# Patient Record
Sex: MALE | Race: BLACK OR AFRICAN AMERICAN | Employment: FULL TIME
[De-identification: names, ages, dates, MRNs, and addresses within clinical notes are randomized per-mention and may not be internally consistent; named-entity substitution may affect disease eponyms.]

---

## 2022-03-19 PROBLEM — Z00.00 PREVENTATIVE HEALTH CARE: Status: ACTIVE | Noted: 2021-11-18

## 2022-03-19 PROBLEM — Z28.29 REFUSAL OF INFLUENZA VACCINE BY PROVIDER: Status: ACTIVE | Noted: 2021-11-18

## 2022-03-19 PROBLEM — R80.9 POSITIVE FOR MICROALBUMINURIA: Status: ACTIVE | Noted: 2021-12-20

## 2022-03-19 PROBLEM — E78.5 HYPERLIPIDEMIA: Status: ACTIVE | Noted: 2021-11-18

## 2022-03-20 PROBLEM — I10 HYPERTENSION: Status: ACTIVE | Noted: 2021-11-18

## 2022-03-24 PROBLEM — D64.9 NORMOCYTIC ANEMIA: Status: ACTIVE | Noted: 2022-03-21

## 2022-06-06 ENCOUNTER — NURSE TRIAGE (OUTPATIENT)
Dept: OTHER | Facility: CLINIC | Age: 62
End: 2022-06-06

## 2022-09-21 ENCOUNTER — OFFICE VISIT (OUTPATIENT)
Dept: INTERNAL MEDICINE CLINIC | Facility: CLINIC | Age: 62
End: 2022-09-21
Payer: COMMERCIAL

## 2022-09-21 VITALS
TEMPERATURE: 98.4 F | DIASTOLIC BLOOD PRESSURE: 83 MMHG | BODY MASS INDEX: 21.67 KG/M2 | WEIGHT: 143 LBS | HEIGHT: 68 IN | OXYGEN SATURATION: 99 % | SYSTOLIC BLOOD PRESSURE: 131 MMHG | HEART RATE: 66 BPM

## 2022-09-21 DIAGNOSIS — G89.29 CHRONIC RIGHT SHOULDER PAIN: ICD-10-CM

## 2022-09-21 DIAGNOSIS — I10 ESSENTIAL (PRIMARY) HYPERTENSION: Primary | ICD-10-CM

## 2022-09-21 DIAGNOSIS — M25.511 CHRONIC RIGHT SHOULDER PAIN: ICD-10-CM

## 2022-09-21 DIAGNOSIS — R80.9 POSITIVE FOR MICROALBUMINURIA: ICD-10-CM

## 2022-09-21 DIAGNOSIS — E78.5 HYPERLIPIDEMIA, UNSPECIFIED HYPERLIPIDEMIA TYPE: ICD-10-CM

## 2022-09-21 PROCEDURE — 99214 OFFICE O/P EST MOD 30 MIN: CPT | Performed by: INTERNAL MEDICINE

## 2022-09-21 RX ORDER — ATORVASTATIN CALCIUM 20 MG/1
20 TABLET, FILM COATED ORAL DAILY
Qty: 90 TABLET | Refills: 1 | Status: SHIPPED | OUTPATIENT
Start: 2022-09-21

## 2022-09-21 RX ORDER — AMLODIPINE BESYLATE 10 MG/1
10 TABLET ORAL DAILY
Qty: 90 TABLET | Refills: 1 | Status: SHIPPED | OUTPATIENT
Start: 2022-09-21

## 2022-09-21 RX ORDER — NAPROXEN 500 MG/1
500 TABLET ORAL 2 TIMES DAILY WITH MEALS
Qty: 180 TABLET | Refills: 1 | Status: SHIPPED | OUTPATIENT
Start: 2022-09-21 | End: 2022-11-20

## 2022-09-21 RX ORDER — RAMIPRIL 1.25 MG/1
1.25 CAPSULE ORAL DAILY
Qty: 90 CAPSULE | Refills: 3 | Status: SHIPPED | OUTPATIENT
Start: 2022-09-21

## 2022-09-21 ASSESSMENT — ENCOUNTER SYMPTOMS
VOMITING: 0
BACK PAIN: 0
ABDOMINAL PAIN: 0
CONSTIPATION: 0
COUGH: 0
SHORTNESS OF BREATH: 0
EYE PAIN: 0
DIARRHEA: 0
NAUSEA: 0
SINUS PRESSURE: 0
SINUS PAIN: 0

## 2022-09-21 ASSESSMENT — PATIENT HEALTH QUESTIONNAIRE - PHQ9
SUM OF ALL RESPONSES TO PHQ QUESTIONS 1-9: 0
1. LITTLE INTEREST OR PLEASURE IN DOING THINGS: 0
2. FEELING DOWN, DEPRESSED OR HOPELESS: 0
SUM OF ALL RESPONSES TO PHQ QUESTIONS 1-9: 0
SUM OF ALL RESPONSES TO PHQ9 QUESTIONS 1 & 2: 0

## 2022-09-21 NOTE — PROGRESS NOTES
Sherif Nguyen (: 1960) presents today for:  Chief Complaint   Patient presents with    6 Month Follow-Up     Pt is here for routine 6 mnth check up. Shoulder Pain     Pt states R shoulder pain that is chronic and intermittent but is worsening. Pt states limited ROM. Medication Refill       Patient is a 58-year-old man with a medical history significant for hypertension, hyperlipidemia, normocytic anemia, and microalbuminunuria presents for a follow-up visit. Patient is eligible for refills at this time out of Norvasc and Lipitor. Patient with microalbuminuria is not currently prescribed ACE inhibitor or ARB. Patient previously well tolerant of Altace but no longer taking. We will advise patient that low-dose Altace would be appropriate at this time. Patient with a problem specific complaint at this time of right shoulder pain that is been chronic and has been worsening and intermittent nature over time. He currently has limited range of motion. We will offer patient physical therapy at this time. Limited ROM particularly with internal rotation. Patient with hx of right forearm corrective surgery with plates and screws. Patient also with right elbow corrective surgery. He currently is unable to sleep on his right side. 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 He has used nothing over the counter. Significant internal range of motion limitations. Shoulder Pain   Pertinent negatives include no fever. Assessment/Plan:  1. Essential (primary) hypertension    2. Hyperlipidemia, unspecified hyperlipidemia type    3. Chronic right shoulder pain    4.  Positive for microalbuminuria        Orders Placed This Encounter   Procedures    Jose Escamilla MD, [address]     Referral Priority:   Routine     Referral Type:   Eval and Treat     Referral Reason:   Specialty Services Required     Requested Specialty:   Orthopedic Surgery     Number of Visits Requested:   1       Orders Placed This Encounter   Medications    amLODIPine (NORVASC) 10 MG tablet     Sig: Take 1 tablet by mouth daily     Dispense:  90 tablet     Refill:  1    atorvastatin (LIPITOR) 20 MG tablet     Sig: Take 1 tablet by mouth daily     Dispense:  90 tablet     Refill:  1    naproxen (NAPROSYN) 500 MG tablet     Sig: Take 1 tablet by mouth 2 times daily (with meals)     Dispense:  180 tablet     Refill:  1    ramipril (ALTACE) 1.25 MG capsule     Sig: Take 1 capsule by mouth daily     Dispense:  90 capsule     Refill:  3         Return in about 6 months (around 3/21/2023). Reviewed and updated this visit by provider:  Tobacco  Allergies  Meds  Problems  Med Hx  Surg Hx  Fam Hx         Review of Systems   Constitutional:  Negative for chills and fever. HENT:  Negative for hearing loss, postnasal drip, sinus pressure and sinus pain. Eyes:  Negative for pain and visual disturbance. Respiratory:  Negative for cough and shortness of breath. Cardiovascular:  Negative for chest pain and palpitations. Gastrointestinal:  Negative for abdominal pain, constipation, diarrhea, nausea and vomiting. Endocrine: Negative for polyuria. Genitourinary:  Negative for difficulty urinating, frequency and urgency. Musculoskeletal:  Negative for arthralgias, back pain and neck pain. Skin:  Negative for rash and wound. Neurological:  Negative for dizziness, weakness and headaches. Psychiatric/Behavioral:  Negative for behavioral problems and sleep disturbance. The patient is not nervous/anxious. Visit Vitals  /83   Pulse 66   Temp 98.4 °F (36.9 °C)   Ht 5' 8\" (1.727 m)   Wt 143 lb (64.9 kg)   SpO2 99%   BMI 21.74 kg/m²       Physical Exam  Vitals reviewed. Constitutional:       General: He is not in acute distress. Appearance: Normal appearance. HENT:      Head: Normocephalic and atraumatic.       Right Ear: External ear normal.      Left Ear: External ear normal.      Nose: Nose normal.      Mouth/Throat:      Mouth: Mucous membranes are moist.      Pharynx: Oropharynx is clear. Eyes:      Extraocular Movements: Extraocular movements intact. Conjunctiva/sclera: Conjunctivae normal.   Cardiovascular:      Rate and Rhythm: Normal rate and regular rhythm. Pulses: Normal pulses. Heart sounds: Normal heart sounds. Pulmonary:      Effort: Pulmonary effort is normal.      Breath sounds: Normal breath sounds. No wheezing. Abdominal:      General: Bowel sounds are normal.      Tenderness: There is no abdominal tenderness. Musculoskeletal:         General: No swelling or deformity. Normal range of motion. Cervical back: Normal range of motion. Skin:     General: Skin is warm and dry. Coloration: Skin is not jaundiced. Neurological:      General: No focal deficit present. Mental Status: He is alert and oriented to person, place, and time. Mental status is at baseline. Psychiatric:         Mood and Affect: Mood normal.         Behavior: Behavior normal.         Thought Content:  Thought content normal.           Laila Levy DO

## 2022-09-21 NOTE — ACP (ADVANCE CARE PLANNING)
Advance Care Planning   Advance Care Planning (ACP)     Attempted to discuss ACP with Mr. Amado Narvaez today, he declines to discuss at this time. Will readdress at future visit.

## 2023-04-24 ENCOUNTER — OFFICE VISIT (OUTPATIENT)
Dept: INTERNAL MEDICINE CLINIC | Facility: CLINIC | Age: 63
End: 2023-04-24
Payer: COMMERCIAL

## 2023-04-24 VITALS
BODY MASS INDEX: 22.28 KG/M2 | OXYGEN SATURATION: 98 % | HEART RATE: 65 BPM | HEIGHT: 68 IN | WEIGHT: 147 LBS | TEMPERATURE: 98.4 F | DIASTOLIC BLOOD PRESSURE: 73 MMHG | SYSTOLIC BLOOD PRESSURE: 119 MMHG

## 2023-04-24 DIAGNOSIS — R80.9 PROTEINURIA, UNSPECIFIED TYPE: ICD-10-CM

## 2023-04-24 DIAGNOSIS — I10 ESSENTIAL (PRIMARY) HYPERTENSION: Primary | ICD-10-CM

## 2023-04-24 DIAGNOSIS — G89.29 CHRONIC RIGHT SHOULDER PAIN: ICD-10-CM

## 2023-04-24 DIAGNOSIS — E78.5 HYPERLIPIDEMIA, UNSPECIFIED HYPERLIPIDEMIA TYPE: ICD-10-CM

## 2023-04-24 DIAGNOSIS — R80.9 POSITIVE FOR MICROALBUMINURIA: ICD-10-CM

## 2023-04-24 DIAGNOSIS — M25.511 CHRONIC RIGHT SHOULDER PAIN: ICD-10-CM

## 2023-04-24 DIAGNOSIS — D64.9 ANEMIA, UNSPECIFIED TYPE: ICD-10-CM

## 2023-04-24 LAB
ERYTHROCYTE [DISTWIDTH] IN BLOOD BY AUTOMATED COUNT: 12 % (ref 11.9–14.6)
HCT VFR BLD AUTO: 38.8 % (ref 41.1–50.3)
HGB BLD-MCNC: 12.5 G/DL (ref 13.6–17.2)
MCH RBC QN AUTO: 31.6 PG (ref 26.1–32.9)
MCHC RBC AUTO-ENTMCNC: 32.2 G/DL (ref 31.4–35)
MCV RBC AUTO: 98.2 FL (ref 82–102)
NRBC # BLD: 0 K/UL (ref 0–0.2)
PLATELET # BLD AUTO: 305 K/UL (ref 150–450)
PMV BLD AUTO: 10.6 FL (ref 9.4–12.3)
RBC # BLD AUTO: 3.95 M/UL (ref 4.23–5.6)
WBC # BLD AUTO: 12.5 K/UL (ref 4.3–11.1)

## 2023-04-24 PROCEDURE — 3078F DIAST BP <80 MM HG: CPT | Performed by: INTERNAL MEDICINE

## 2023-04-24 PROCEDURE — 99214 OFFICE O/P EST MOD 30 MIN: CPT | Performed by: INTERNAL MEDICINE

## 2023-04-24 PROCEDURE — 3074F SYST BP LT 130 MM HG: CPT | Performed by: INTERNAL MEDICINE

## 2023-04-24 RX ORDER — AMLODIPINE BESYLATE 10 MG/1
10 TABLET ORAL DAILY
Qty: 90 TABLET | Refills: 1 | Status: SHIPPED | OUTPATIENT
Start: 2023-04-24

## 2023-04-24 RX ORDER — RAMIPRIL 1.25 MG/1
1.25 CAPSULE ORAL DAILY
Qty: 90 CAPSULE | Refills: 1 | Status: SHIPPED | OUTPATIENT
Start: 2023-04-24

## 2023-04-24 RX ORDER — ATORVASTATIN CALCIUM 20 MG/1
20 TABLET, FILM COATED ORAL DAILY
Qty: 90 TABLET | Refills: 1 | Status: SHIPPED | OUTPATIENT
Start: 2023-04-24

## 2023-04-24 ASSESSMENT — PATIENT HEALTH QUESTIONNAIRE - PHQ9
1. LITTLE INTEREST OR PLEASURE IN DOING THINGS: 0
SUM OF ALL RESPONSES TO PHQ QUESTIONS 1-9: 0
2. FEELING DOWN, DEPRESSED OR HOPELESS: 0
SUM OF ALL RESPONSES TO PHQ9 QUESTIONS 1 & 2: 0
SUM OF ALL RESPONSES TO PHQ QUESTIONS 1-9: 0

## 2023-04-24 NOTE — ACP (ADVANCE CARE PLANNING)
Advance Care Planning   The patient has the following advanced directives on file:  Advance Directives       Power of [address] ACP-Advance Directive ACP-Power of     Not on File Not on File Not on File Not on File            The patient has appointed the following active healthcare agents:    Primary Decision Maker: Ebonie Cortez - Spouse - [phone]    The Patient has the following current code status:    Code Status: Not on file    Visit Documentation:  I discussed [address] with Madeline Lancaster today which included the importance of making their choices for care and treatment in the case of a health event that adversely affects their decision-making abilities. He has not completed the Advance Care Directives. He does not have an active health care agent at this time. Madeline Lancaster was encouraged to complete the declaration forms and provide a signed copy of his medical records.      Aaron Mead  4/24/2023

## 2023-04-24 NOTE — ASSESSMENT & PLAN NOTE
Referred previously to Ortho and PT. Patients shoulder pain chronic and intermittent in nature with decreased range of mostion exacerbated by work. Patient has not yet established with area orthopedics. Referral is closed. Will refer again. Checking XR at this time. Will offer Voltaren gel topically. If no bony abnormality, will offer intra-articular steroid injection in 1 week. Will refer to Ortho again.

## 2023-04-24 NOTE — ASSESSMENT & PLAN NOTE
Agreeable to checking lipids today to confirm dosing. Key Hyperlipidemia Meds          atorvastatin (LIPITOR) 20 MG tablet (Taking)    Sig - Route:  Take 1 tablet by mouth daily - Oral

## 2023-04-24 NOTE — PROGRESS NOTES
Cristiana Bernstein (: 1960) is a 65 y.o. male, here for evaluation of the following chief complaint(s):  Hypertension (Pt is here for routine 3 month check up.), Medication Refill, and Shoulder Pain (Pt states chronic pain in R shoulder that has been present x 1 year but is worsening.)       ASSESSMENT/PLAN:  1. Essential (primary) hypertension  Assessment & Plan:   Microalbuin present recently. Started back on low dose Altace. Tolerant of Norvasc 10. Key Anti-Hypertensive Meds            amLODIPine (NORVASC) 10 MG tablet    Sig - Route: Take 1 tablet by mouth daily - Oral    ramipril (ALTACE) 1.25 MG capsule    Sig - Route: Take 1 capsule by mouth daily - Oral            Orders:  -     amLODIPine (NORVASC) 10 MG tablet; Take 1 tablet by mouth daily, Disp-90 tablet, R-1Normal  -     ramipril (ALTACE) 1.25 MG capsule; Take 1 capsule by mouth daily, Disp-90 capsule, R-1Normal  2. Chronic right shoulder pain  Assessment & Plan:   Referred previously to Ortho and PT. Patients shoulder pain chronic and intermittent in nature with decreased range of mostion exacerbated by work. Patient has not yet established with area orthopedics. Referral is closed. Will refer again. Checking XR at this time. Will offer Voltaren gel topically. If no bony abnormality, will offer intra-articular steroid injection in 1 week. Will refer to Ortho again. Orders:  -     Vitamin D 25 Hydroxy; Future  -     XR SHOULDER RIGHT (MIN 2 VIEWS); Future  -     diclofenac sodium (VOLTAREN) 1 % GEL; Apply 4 g topically 4 times daily, Topical, 4 TIMES DAILY Starting Mon 2023, Disp-100 g, R-3, Normal  -     [address] [place] [address]  3. Anemia, unspecified type  -     CBC; Future  4. Hyperlipidemia, unspecified hyperlipidemia type  Assessment & Plan:   Agreeable to checking lipids today to confirm dosing.    Key Hyperlipidemia Meds            atorvastatin (LIPITOR) 20 MG tablet (Taking)    Sig -

## 2023-04-24 NOTE — ASSESSMENT & PLAN NOTE
Microalbuin present recently. Started back on low dose Altace. Tolerant of Norvasc 10. Key Anti-Hypertensive Meds          amLODIPine (NORVASC) 10 MG tablet    Sig - Route: Take 1 tablet by mouth daily - Oral    ramipril (ALTACE) 1.25 MG capsule    Sig - Route:  Take 1 capsule by mouth daily - Oral

## 2023-04-25 LAB
25(OH)D3 SERPL-MCNC: 20.3 NG/ML (ref 30–100)
ALBUMIN SERPL-MCNC: 4 G/DL (ref 3.2–4.6)
ALBUMIN/GLOB SERPL: 1.4 (ref 0.4–1.6)
ALP SERPL-CCNC: 57 U/L (ref 50–136)
ALT SERPL-CCNC: 47 U/L (ref 12–65)
ANION GAP SERPL CALC-SCNC: 3 MMOL/L (ref 2–11)
AST SERPL-CCNC: 28 U/L (ref 15–37)
BILIRUB SERPL-MCNC: 0.8 MG/DL (ref 0.2–1.1)
BUN SERPL-MCNC: 18 MG/DL (ref 8–23)
CALCIUM SERPL-MCNC: 9.3 MG/DL (ref 8.3–10.4)
CHLORIDE SERPL-SCNC: 108 MMOL/L (ref 101–110)
CHOLEST SERPL-MCNC: 142 MG/DL
CO2 SERPL-SCNC: 28 MMOL/L (ref 21–32)
CREAT SERPL-MCNC: 0.9 MG/DL (ref 0.8–1.5)
GLOBULIN SER CALC-MCNC: 2.8 G/DL (ref 2.8–4.5)
GLUCOSE SERPL-MCNC: 87 MG/DL (ref 65–100)
HDLC SERPL-MCNC: 72 MG/DL (ref 40–60)
HDLC SERPL: 2
LDLC SERPL CALC-MCNC: 54.4 MG/DL
POTASSIUM SERPL-SCNC: 4.6 MMOL/L (ref 3.5–5.1)
PROT SERPL-MCNC: 6.8 G/DL (ref 6.3–8.2)
SODIUM SERPL-SCNC: 139 MMOL/L (ref 133–143)
TRIGL SERPL-MCNC: 78 MG/DL (ref 35–150)
VLDLC SERPL CALC-MCNC: 15.6 MG/DL (ref 6–23)

## 2023-05-01 ENCOUNTER — TELEPHONE (OUTPATIENT)
Dept: INTERNAL MEDICINE CLINIC | Facility: CLINIC | Age: 63
End: 2023-05-01

## 2023-05-01 NOTE — TELEPHONE ENCOUNTER
Mrs Fannie Espinosa called, very concerned that on 4/24, her  was given someone else's paperwork . It was just noticed today, they were looking for the appointment on 5/4 for a steroid injection. Mrs Fannie Espinosa is very concerned that her 's paperwork was given to somebody else. Please call.

## 2023-05-01 NOTE — TELEPHONE ENCOUNTER
I called and she stated that her  was given the wrong paperwork when he had his last appt on 04/24/2023. I ask her to bring the paperwork back and she stated that he would have to be 05/04/2023 his next appt . Told her that I would figure out what happen and I am very sorry . She stated that she just noticed it .

## 2023-05-02 ENCOUNTER — TELEPHONE (OUTPATIENT)
Dept: INTERNAL MEDICINE CLINIC | Facility: CLINIC | Age: 63
End: 2023-05-02

## 2023-05-04 ENCOUNTER — OFFICE VISIT (OUTPATIENT)
Dept: INTERNAL MEDICINE CLINIC | Facility: CLINIC | Age: 63
End: 2023-05-04

## 2023-05-04 VITALS
HEIGHT: 68 IN | SYSTOLIC BLOOD PRESSURE: 125 MMHG | TEMPERATURE: 98.4 F | WEIGHT: 147.6 LBS | OXYGEN SATURATION: 97 % | DIASTOLIC BLOOD PRESSURE: 76 MMHG | HEART RATE: 69 BPM | BODY MASS INDEX: 22.37 KG/M2

## 2023-05-04 DIAGNOSIS — M25.511 CHRONIC RIGHT SHOULDER PAIN: Primary | ICD-10-CM

## 2023-05-04 DIAGNOSIS — G89.29 CHRONIC RIGHT SHOULDER PAIN: Primary | ICD-10-CM

## 2023-05-04 DIAGNOSIS — Z12.5 PROSTATE CANCER SCREENING: ICD-10-CM

## 2023-05-04 DIAGNOSIS — E55.9 VITAMIN D DEFICIENCY: ICD-10-CM

## 2023-05-04 RX ORDER — LIDOCAINE HYDROCHLORIDE 10 MG/ML
5 INJECTION, SOLUTION INFILTRATION; PERINEURAL ONCE
Status: COMPLETED | OUTPATIENT
Start: 2023-05-04 | End: 2023-05-04

## 2023-05-04 RX ORDER — TRIAMCINOLONE ACETONIDE 40 MG/ML
40 INJECTION, SUSPENSION INTRA-ARTICULAR; INTRAMUSCULAR ONCE
Status: COMPLETED | OUTPATIENT
Start: 2023-05-04 | End: 2023-05-04

## 2023-05-04 RX ORDER — MULTIVIT WITH MIN/MFOLATE/K2 340-15/3 G
5000 POWDER (GRAM) ORAL DAILY
Qty: 90 CAPSULE | Refills: 3 | Status: SHIPPED | OUTPATIENT
Start: 2023-05-04

## 2023-05-04 RX ADMIN — TRIAMCINOLONE ACETONIDE 40 MG: 40 INJECTION, SUSPENSION INTRA-ARTICULAR; INTRAMUSCULAR at 14:52

## 2023-05-04 RX ADMIN — LIDOCAINE HYDROCHLORIDE 5 ML: 10 INJECTION, SOLUTION INFILTRATION; PERINEURAL at 14:48

## 2023-05-04 SDOH — ECONOMIC STABILITY: FOOD INSECURITY: WITHIN THE PAST 12 MONTHS, THE FOOD YOU BOUGHT JUST DIDN'T LAST AND YOU DIDN'T HAVE MONEY TO GET MORE.: NEVER TRUE

## 2023-05-04 SDOH — ECONOMIC STABILITY: FOOD INSECURITY: WITHIN THE PAST 12 MONTHS, YOU WORRIED THAT YOUR FOOD WOULD RUN OUT BEFORE YOU GOT MONEY TO BUY MORE.: NEVER TRUE

## 2023-05-04 SDOH — ECONOMIC STABILITY: HOUSING INSECURITY
IN THE LAST 12 MONTHS, WAS THERE A TIME WHEN YOU DID NOT HAVE A STEADY PLACE TO SLEEP OR SLEPT IN A SHELTER (INCLUDING NOW)?: NO

## 2023-05-04 SDOH — ECONOMIC STABILITY: INCOME INSECURITY: HOW HARD IS IT FOR YOU TO PAY FOR THE VERY BASICS LIKE FOOD, HOUSING, MEDICAL CARE, AND HEATING?: NOT VERY HARD

## 2023-05-04 ASSESSMENT — PATIENT HEALTH QUESTIONNAIRE - PHQ9
SUM OF ALL RESPONSES TO PHQ QUESTIONS 1-9: 0
2. FEELING DOWN, DEPRESSED OR HOPELESS: 0
SUM OF ALL RESPONSES TO PHQ QUESTIONS 1-9: 0
1. LITTLE INTEREST OR PLEASURE IN DOING THINGS: 0
SUM OF ALL RESPONSES TO PHQ9 QUESTIONS 1 & 2: 0

## 2023-05-04 NOTE — ASSESSMENT & PLAN NOTE
Recommending 5000 IU D3 brittany.    Lab Results   Component Value Date/Time    VITD25 20.3 04/24/2023 01:51 PM

## 2023-05-04 NOTE — ASSESSMENT & PLAN NOTE
Brother with prostate cancer. . Would like to being surveillance. Using shared decision making, will utilize annual or semi-annual screening.

## 2023-05-04 NOTE — PROGRESS NOTES
Micha Cowan (: 1960) is a 65 y.o. male, here for evaluation of the following chief complaint(s):  Shoulder Pain (Cortisone Injection Today R Shoulder)       ASSESSMENT/PLAN:  1. Chronic right shoulder pain  Assessment & Plan:  Patient with chronic right shoulder pain. He is agreeable to intra-articular steroid injection in office today. Offering injection with posterior approach using Kenalog 5 and Lidocaine without epinephrine 3 mg. Orders:  -     triamcinolone acetonide (KENALOG) injection 10 mg; 10 mg, Intra-artICUlar, ONCE, 1 dose, On Thu 23 at 1500  -     lidocaine 1 % injection 5 mL; 5 mL, IntraDERmal, ONCE, 1 dose, On Thu 23 at 1500  2. Prostate cancer screening  Assessment & Plan:   Brother with prostate cancer. . Would like to being surveillance. Using shared decision making, will utilize annual or semi-annual screening. Orders:  -     PSA Screening; Future  3. Vitamin D deficiency  Assessment & Plan:  Recommending 5000 IU D3 dailo. Lab Results   Component Value Date/Time    VITD25 20.3 2023 01:51 PM        Orders:  -     vitamin D (D3 5000) 125 MCG (5000 UT) CAPS capsule; Take 1 capsule by mouth daily, Disp-90 capsule, R-3Normal             SUBJECTIVE/OBJECTIVE:  HPI: Patient is a pleasant 81-year-old man with medical history significant for hypertension, chronic right shoulder pain presenting for problem specific complaint of shoulder pain. Patient shoulder has been worsening over the past several months. Patient is agreeable to intra-articular steroid injection at this time. Superficial skin Marked with Pen for insertion. Area cleaned with Betadine. Injection into the area. Patient tolerated procedure well. Social History     Tobacco Use    Smoking status: Never    Smokeless tobacco: Never   Vaping Use    Vaping Use: Never used   Substance Use Topics    Alcohol use:  Yes     Alcohol/week: 1.0 standard drink     Types: 1 Shots of liquor per

## 2023-05-04 NOTE — ACP (ADVANCE CARE PLANNING)
Advance Care Planning   The patient has the following advanced directives on file:  Advance Directives       Power of [address] ACP-Advance Directive ACP-Power of     Not on File Not on File Not on File Not on File            The patient has appointed the following active healthcare agents:    Primary Decision Maker: Kpshona Corby - Spouse - [phone]    The Patient has the following current code status:    Code Status: Not on file    Visit Documentation:  I discussed [address] with Aureliano Santana today which included the importance of making their choices for care and treatment in the case of a health event that adversely affects their decision-making abilities. He has not completed the Advance Care Directives. He does not have an active health care agent at this time. Aureliano Santana was encouraged to complete the declaration forms and provide a signed copy of his medical records.          Aaron Mead  5/4/2023

## 2023-05-04 NOTE — ASSESSMENT & PLAN NOTE
Patient with chronic right shoulder pain. He is agreeable to intra-articular steroid injection in office today. Offering injection with posterior approach using Kenalog 5 and Lidocaine without epinephrine 3 mg.

## 2023-05-05 LAB — PSA SERPL-MCNC: 0.8 NG/ML

## 2023-05-09 ENCOUNTER — OFFICE VISIT (OUTPATIENT)
Dept: INTERNAL MEDICINE CLINIC | Facility: CLINIC | Age: 63
End: 2023-05-09
Payer: COMMERCIAL

## 2023-05-09 VITALS
HEIGHT: 68 IN | OXYGEN SATURATION: 96 % | SYSTOLIC BLOOD PRESSURE: 116 MMHG | WEIGHT: 145.2 LBS | TEMPERATURE: 98.5 F | DIASTOLIC BLOOD PRESSURE: 75 MMHG | BODY MASS INDEX: 22.01 KG/M2 | HEART RATE: 59 BPM

## 2023-05-09 DIAGNOSIS — R10.9 RIGHT FLANK PAIN: ICD-10-CM

## 2023-05-09 DIAGNOSIS — I10 ESSENTIAL (PRIMARY) HYPERTENSION: ICD-10-CM

## 2023-05-09 DIAGNOSIS — Z12.5 PROSTATE CANCER SCREENING: ICD-10-CM

## 2023-05-09 DIAGNOSIS — M54.50 PAIN IN RIGHT LUMBAR REGION OF BACK: Primary | ICD-10-CM

## 2023-05-09 LAB
ALBUMIN SERPL-MCNC: 4 G/DL (ref 3.2–4.6)
ALBUMIN/GLOB SERPL: 1.4 (ref 0.4–1.6)
ALP SERPL-CCNC: 57 U/L (ref 50–136)
ALT SERPL-CCNC: 43 U/L (ref 12–65)
ANION GAP SERPL CALC-SCNC: 4 MMOL/L (ref 2–11)
APPEARANCE UR: CLEAR
AST SERPL-CCNC: 27 U/L (ref 15–37)
BACTERIA URNS QL MICRO: NEGATIVE /HPF
BILIRUB SERPL-MCNC: 1.3 MG/DL (ref 0.2–1.1)
BILIRUB UR QL: NEGATIVE
BUN SERPL-MCNC: 14 MG/DL (ref 8–23)
CALCIUM SERPL-MCNC: 9.3 MG/DL (ref 8.3–10.4)
CASTS URNS QL MICRO: NORMAL /LPF (ref 0–2)
CHLORIDE SERPL-SCNC: 107 MMOL/L (ref 101–110)
CO2 SERPL-SCNC: 27 MMOL/L (ref 21–32)
COLOR UR: NORMAL
CREAT SERPL-MCNC: 1 MG/DL (ref 0.8–1.5)
EPI CELLS #/AREA URNS HPF: NORMAL /HPF (ref 0–5)
ERYTHROCYTE [DISTWIDTH] IN BLOOD BY AUTOMATED COUNT: 12.5 % (ref 11.9–14.6)
GLOBULIN SER CALC-MCNC: 2.8 G/DL (ref 2.8–4.5)
GLUCOSE SERPL-MCNC: 88 MG/DL (ref 65–100)
GLUCOSE UR STRIP.AUTO-MCNC: NEGATIVE MG/DL
HCT VFR BLD AUTO: 38.6 % (ref 41.1–50.3)
HGB BLD-MCNC: 12.1 G/DL (ref 13.6–17.2)
HGB UR QL STRIP: NEGATIVE
KETONES UR QL STRIP.AUTO: NEGATIVE MG/DL
LEUKOCYTE ESTERASE UR QL STRIP.AUTO: NEGATIVE
MCH RBC QN AUTO: 31.5 PG (ref 26.1–32.9)
MCHC RBC AUTO-ENTMCNC: 31.3 G/DL (ref 31.4–35)
MCV RBC AUTO: 100.5 FL (ref 82–102)
MUCOUS THREADS URNS QL MICRO: 0 /LPF
NITRITE UR QL STRIP.AUTO: NEGATIVE
NRBC # BLD: 0 K/UL (ref 0–0.2)
PH UR STRIP: 6 (ref 5–9)
PLATELET # BLD AUTO: 282 K/UL (ref 150–450)
PMV BLD AUTO: 10.3 FL (ref 9.4–12.3)
POTASSIUM SERPL-SCNC: 4.3 MMOL/L (ref 3.5–5.1)
PROT SERPL-MCNC: 6.8 G/DL (ref 6.3–8.2)
PROT UR STRIP-MCNC: NEGATIVE MG/DL
RBC # BLD AUTO: 3.84 M/UL (ref 4.23–5.6)
RBC #/AREA URNS HPF: NORMAL /HPF (ref 0–5)
SODIUM SERPL-SCNC: 138 MMOL/L (ref 133–143)
SP GR UR REFRACTOMETRY: 1.01 (ref 1–1.02)
URINE CULTURE IF INDICATED: NORMAL
UROBILINOGEN UR QL STRIP.AUTO: 0.2 EU/DL (ref 0.2–1)
WBC # BLD AUTO: 12.5 K/UL (ref 4.3–11.1)
WBC URNS QL MICRO: NORMAL /HPF (ref 0–4)

## 2023-05-09 PROCEDURE — 99214 OFFICE O/P EST MOD 30 MIN: CPT | Performed by: INTERNAL MEDICINE

## 2023-05-09 PROCEDURE — 3078F DIAST BP <80 MM HG: CPT | Performed by: INTERNAL MEDICINE

## 2023-05-09 PROCEDURE — 3074F SYST BP LT 130 MM HG: CPT | Performed by: INTERNAL MEDICINE

## 2023-05-09 RX ORDER — KETOROLAC TROMETHAMINE 10 MG/1
10 TABLET, FILM COATED ORAL EVERY 6 HOURS PRN
Qty: 20 TABLET | Refills: 0 | Status: SHIPPED | OUTPATIENT
Start: 2023-05-09 | End: 2024-05-08

## 2023-05-09 RX ORDER — CYCLOBENZAPRINE HCL 5 MG
5 TABLET ORAL 3 TIMES DAILY PRN
Qty: 30 TABLET | Refills: 0 | Status: SHIPPED | OUTPATIENT
Start: 2023-05-09 | End: 2023-05-19

## 2023-05-09 ASSESSMENT — PATIENT HEALTH QUESTIONNAIRE - PHQ9
2. FEELING DOWN, DEPRESSED OR HOPELESS: 0
SUM OF ALL RESPONSES TO PHQ QUESTIONS 1-9: 0
1. LITTLE INTEREST OR PLEASURE IN DOING THINGS: 0
SUM OF ALL RESPONSES TO PHQ QUESTIONS 1-9: 0
SUM OF ALL RESPONSES TO PHQ9 QUESTIONS 1 & 2: 0
SUM OF ALL RESPONSES TO PHQ QUESTIONS 1-9: 0
SUM OF ALL RESPONSES TO PHQ QUESTIONS 1-9: 0

## 2023-05-09 NOTE — ASSESSMENT & PLAN NOTE
Well controlled in office. Key Anti-Hypertensive Meds          amLODIPine (NORVASC) 10 MG tablet (Taking)    Sig - Route: Take 1 tablet by mouth daily - Oral    ramipril (ALTACE) 1.25 MG capsule (Taking)    Sig - Route:  Take 1 capsule by mouth daily - Oral

## 2023-05-09 NOTE — ACP (ADVANCE CARE PLANNING)
Advance Care Planning   The patient has the following advanced directives on file:  Advance Directives       Power of [address] ACP-Advance Directive ACP-Power of     Not on File Not on File Not on File Not on File            The patient has appointed the following active healthcare agents:    Primary Decision Maker: Shilpa Cole - Spouse - [phone]    The Patient has the following current code status:    Code Status: Not on file    Visit Documentation:  I discussed [address] with Natalie Ybarra today which included the importance of making their choices for care and treatment in the case of a health event that adversely affects their decision-making abilities. He has not completed the Advance Care Directives. He does not have an active health care agent at this time. Natalie Ybarra was encouraged to complete the declaration forms and provide a signed copy of his medical records.      Aaron Mead  5/9/2023

## 2023-05-09 NOTE — ASSESSMENT & PLAN NOTE
Mom has baby latched well on right breast. She denies pain. Encouraged to call if needing assistance.    Pain rated 710. Described as sharp and achey. Right sided radicular pain. Recent PSA reassuring. Checking XR at this time.

## 2023-05-09 NOTE — PROGRESS NOTES
Merlinda Dutton (: 1960) is a 65 y.o. male, here for evaluation of the following chief complaint(s):  Lower Back Pain (Pt states that he is having some pain in his lower back on the R side that has been present x 2 weeks.)       ASSESSMENT/PLAN:  1. Pain in right lumbar region of back             SUBJECTIVE/OBJECTIVE:  HPI:  Social History     Tobacco Use    Smoking status: Never    Smokeless tobacco: Never   Vaping Use    Vaping Use: Never used   Substance Use Topics    Alcohol use: Yes     Alcohol/week: 1.0 standard drink     Types: 1 Shots of liquor per week    Drug use: Never     Vitals:    23 1000   BP: 116/75   Pulse: 59   Temp: 98.5 °F (36.9 °C)   SpO2: 96%   Weight: 145 lb 3.2 oz (65.9 kg)   Height: 5' 8\" (1.727 m)      Body mass index is 22.08 kg/m². Physical Exam  Constitutional:       Appearance: Normal appearance. HENT:      Head: Normocephalic. Eyes:      Extraocular Movements: Extraocular movements intact. Cardiovascular:      Rate and Rhythm: Normal rate and regular rhythm. Abdominal:      General: Abdomen is flat. Bowel sounds are normal.      Palpations: Abdomen is soft. Tenderness: There is right CVA tenderness. Skin:     General: Skin is warm and dry. Neurological:      Mental Status: He is alert. An electronic signature was used to authenticate this note.   Mandy Villa DO

## 2023-05-16 PROBLEM — M19.072 ARTHRITIS OF LEFT SUBTALAR JOINT: Status: ACTIVE | Noted: 2018-04-02

## 2023-05-16 PROBLEM — M25.572 LEFT LATERAL ANKLE PAIN: Status: ACTIVE | Noted: 2017-05-01

## 2023-05-16 PROBLEM — S42.301A RIGHT ARM FRACTURE: Status: ACTIVE | Noted: 2023-05-16

## 2023-05-16 PROBLEM — G44.009 CLUSTER HEADACHES: Status: ACTIVE | Noted: 2023-04-13

## 2023-06-03 PROBLEM — Z12.5 PROSTATE CANCER SCREENING: Status: RESOLVED | Noted: 2023-05-04 | Resolved: 2023-06-03

## 2023-06-20 ENCOUNTER — OFFICE VISIT (OUTPATIENT)
Dept: INTERNAL MEDICINE CLINIC | Facility: CLINIC | Age: 63
End: 2023-06-20
Payer: COMMERCIAL

## 2023-06-20 VITALS
OXYGEN SATURATION: 95 % | DIASTOLIC BLOOD PRESSURE: 92 MMHG | HEART RATE: 71 BPM | TEMPERATURE: 98.4 F | SYSTOLIC BLOOD PRESSURE: 123 MMHG | HEIGHT: 68 IN | WEIGHT: 146.2 LBS | BODY MASS INDEX: 22.16 KG/M2

## 2023-06-20 DIAGNOSIS — I10 ESSENTIAL (PRIMARY) HYPERTENSION: Primary | ICD-10-CM

## 2023-06-20 DIAGNOSIS — E55.9 VITAMIN D DEFICIENCY: ICD-10-CM

## 2023-06-20 DIAGNOSIS — D64.9 ANEMIA, UNSPECIFIED TYPE: ICD-10-CM

## 2023-06-20 DIAGNOSIS — S61.235S: ICD-10-CM

## 2023-06-20 LAB
CRP SERPL-MCNC: <0.3 MG/DL (ref 0–0.9)
ERYTHROCYTE [DISTWIDTH] IN BLOOD BY AUTOMATED COUNT: 12.7 % (ref 11.9–14.6)
ERYTHROCYTE [SEDIMENTATION RATE] IN BLOOD: 5 MM/HR
FERRITIN SERPL-MCNC: 192 NG/ML (ref 8–388)
HCT VFR BLD AUTO: 42 % (ref 41.1–50.3)
HGB BLD-MCNC: 13.2 G/DL (ref 13.6–17.2)
MCH RBC QN AUTO: 32.1 PG (ref 26.1–32.9)
MCHC RBC AUTO-ENTMCNC: 31.4 G/DL (ref 31.4–35)
MCV RBC AUTO: 102.2 FL (ref 82–102)
NRBC # BLD: 0 K/UL (ref 0–0.2)
PLATELET # BLD AUTO: 279 K/UL (ref 150–450)
PMV BLD AUTO: 10.6 FL (ref 9.4–12.3)
RBC # BLD AUTO: 4.11 M/UL (ref 4.23–5.6)
WBC # BLD AUTO: 9.6 K/UL (ref 4.3–11.1)

## 2023-06-20 PROCEDURE — 99214 OFFICE O/P EST MOD 30 MIN: CPT | Performed by: INTERNAL MEDICINE

## 2023-06-20 PROCEDURE — 3074F SYST BP LT 130 MM HG: CPT | Performed by: INTERNAL MEDICINE

## 2023-06-20 PROCEDURE — 3080F DIAST BP >= 90 MM HG: CPT | Performed by: INTERNAL MEDICINE

## 2023-06-20 RX ORDER — DOXYCYCLINE HYCLATE 100 MG
100 TABLET ORAL 2 TIMES DAILY
Qty: 20 TABLET | Refills: 0 | Status: SHIPPED | OUTPATIENT
Start: 2023-06-20 | End: 2023-06-30

## 2023-06-20 ASSESSMENT — PATIENT HEALTH QUESTIONNAIRE - PHQ9
SUM OF ALL RESPONSES TO PHQ QUESTIONS 1-9: 0
1. LITTLE INTEREST OR PLEASURE IN DOING THINGS: 0
SUM OF ALL RESPONSES TO PHQ QUESTIONS 1-9: 0
SUM OF ALL RESPONSES TO PHQ QUESTIONS 1-9: 0
SUM OF ALL RESPONSES TO PHQ9 QUESTIONS 1 & 2: 0
SUM OF ALL RESPONSES TO PHQ QUESTIONS 1-9: 0
2. FEELING DOWN, DEPRESSED OR HOPELESS: 0

## 2023-06-20 NOTE — ASSESSMENT & PLAN NOTE
Slight elevated in office to 123/92. Key Anti-Hypertensive Meds          amLODIPine (NORVASC) 10 MG tablet (Taking)    Sig - Route: Take 1 tablet by mouth daily - Oral    ramipril (ALTACE) 1.25 MG capsule (Taking)    Sig - Route: Take 1 capsule by mouth daily - Oral        Will continue. Consider dose increase. Recommending home BP monitoring.

## 2023-06-20 NOTE — ASSESSMENT & PLAN NOTE
.lastivtd  Lab Results   Component Value Date/Time    VITD25 20.3 04/24/2023 01:51 PM      prescribed 5000 IU D3 daily.

## 2023-06-20 NOTE — PROGRESS NOTES
Dolly Epley (: 1960) is a 65 y.o. male, here for evaluation of the following chief complaint(s):  1 Month Follow-Up (Pt is here to f/u on previous back pain. Pt states pain only occurs if he bends over. Pt states pain is intermittent.) and Finger Pain (Pt states he has a pain in his 2nd digit on the L hand that looks swollen and is painful.)       ASSESSMENT/PLAN:  1. Essential (primary) hypertension  Assessment & Plan:   Slight elevated in office to 123/92. Key Anti-Hypertensive Meds            amLODIPine (NORVASC) 10 MG tablet (Taking)    Sig - Route: Take 1 tablet by mouth daily - Oral    ramipril (ALTACE) 1.25 MG capsule (Taking)    Sig - Route: Take 1 capsule by mouth daily - Oral          Will continue. Consider dose increase. Recommending home BP monitoring. 2. Vitamin D deficiency  Assessment & Plan:  .lastivtd  Lab Results   Component Value Date/Time    VITD25 20.3 2023 01:51 PM      prescribed 5000 IU D3 daily. 3. Anemia, unspecified type  Assessment & Plan:  Single episode of anemia with increase leukocyte count on establishment labs. SUBJECTIVE/OBJECTIVE:  HPI: Patient is a 71-year-old man with medical history significant for hypertension, cluster headache, vitamin D deficiency, and hyperlipidemia that presents for routine follow-up visit. Patient with establishment labs and a single episode of anemia most recently. Patient eligible for all routine screening vaccination methods. Blood pressure slightly elevated in office today to 123/92 on 2 agents. Concern for left 4th digit foreign body or injury. Initial injury occurred almost 2 years ago with a nail gun. He nailed through a hard window hitting his finger. Significant bleeding at that time, but patinet did not seek medical care. Patient with continued pain and significant edema. Exquisit pain with palpation. Offering XR at this time and course of doxycycline for MRSA coverage.  Patient may respond well

## 2023-06-20 NOTE — ACP (ADVANCE CARE PLANNING)
Advance Care Planning   The patient has the following advanced directives on file:  Advance Directives       Power of [address] ACP-Advance Directive ACP-Power of     Not on File Not on File Not on File Not on File            The patient has appointed the following active healthcare agents:    Primary Decision Maker: Rosalino Child - Spouse - [phone]    The Patient has the following current code status:    Code Status: Not on file    Visit Documentation:  I discussed [address] with Rock Herring today which included the importance of making their choices for care and treatment in the case of a health event that adversely affects their decision-making abilities. He has not completed the Advance Care Directives. He does not have an active health care agent at this time. Rock Herring was encouraged to complete the declaration forms and provide a signed copy of his medical records.        Aaron Mead  6/20/2023

## 2023-08-04 ENCOUNTER — OFFICE VISIT (OUTPATIENT)
Dept: INTERNAL MEDICINE CLINIC | Facility: CLINIC | Age: 63
End: 2023-08-04
Payer: COMMERCIAL

## 2023-08-04 VITALS
OXYGEN SATURATION: 97 % | DIASTOLIC BLOOD PRESSURE: 78 MMHG | WEIGHT: 147.4 LBS | SYSTOLIC BLOOD PRESSURE: 127 MMHG | HEIGHT: 68 IN | TEMPERATURE: 98.5 F | BODY MASS INDEX: 22.34 KG/M2 | HEART RATE: 65 BPM

## 2023-08-04 DIAGNOSIS — M25.511 CHRONIC RIGHT SHOULDER PAIN: ICD-10-CM

## 2023-08-04 DIAGNOSIS — I10 ESSENTIAL (PRIMARY) HYPERTENSION: ICD-10-CM

## 2023-08-04 DIAGNOSIS — G89.29 CHRONIC RIGHT SHOULDER PAIN: ICD-10-CM

## 2023-08-04 DIAGNOSIS — R07.9 CHEST PAIN, UNSPECIFIED TYPE: Primary | ICD-10-CM

## 2023-08-04 DIAGNOSIS — R07.89 CHEST DISCOMFORT: ICD-10-CM

## 2023-08-04 PROCEDURE — 3078F DIAST BP <80 MM HG: CPT | Performed by: INTERNAL MEDICINE

## 2023-08-04 PROCEDURE — 93000 ELECTROCARDIOGRAM COMPLETE: CPT | Performed by: INTERNAL MEDICINE

## 2023-08-04 PROCEDURE — 3074F SYST BP LT 130 MM HG: CPT | Performed by: INTERNAL MEDICINE

## 2023-08-04 PROCEDURE — 99214 OFFICE O/P EST MOD 30 MIN: CPT | Performed by: INTERNAL MEDICINE

## 2023-08-04 NOTE — ASSESSMENT & PLAN NOTE
Well controlled in office. Key Anti-Hypertensive Meds          amLODIPine (NORVASC) 10 MG tablet (Taking)    Sig - Route: Take 1 tablet by mouth daily - Oral    ramipril (ALTACE) 1.25 MG capsule (Taking)    Sig - Route: Take 1 capsule by mouth daily - Oral        EKG in office with [facility] present.  Referring to Cardiology

## 2023-08-04 NOTE — PROGRESS NOTES
Maya Roach (: 1960) is a 59 y.o. male, here for evaluation of the following chief complaint(s):  Shoulder Pain (The pt is in to f/u on chronic R shoulder pain. At the last visit the pt was given an injection in the R shoulder. The pt notes he is feeling better after the injection. The pt reports a 0/10 on the pain scale at this time. The pt notes he has been having some L chest pain. He states it comes and goes. He said it happens more when he is supine and breathing. He denies L arm pain, jaw pain, sweatiness, or clamminess. )       ASSESSMENT/PLAN:  1. Chest pain, unspecified type  -     EKG 12 Lead  2. Essential (primary) hypertension  Assessment & Plan:  Well controlled in office. Key Anti-Hypertensive Meds            amLODIPine (NORVASC) 10 MG tablet (Taking)    Sig - Route: Take 1 tablet by mouth daily - Oral    ramipril (ALTACE) 1.25 MG capsule (Taking)    Sig - Route: Take 1 capsule by mouth daily - Oral          EKG in office with [facility] present. Referring to Cardiology   Orders:  -     [place]  3. Chest discomfort  Comments:  Intermittent left chest discomfort worse supine and with inspiration . Orders:  -     [place]  -     XR CHEST PA LAT (2 VIEWS); Future  4. Chronic right shoulder pain  Assessment & Plan:  Intra-articular [id number] injection with complete resolution of symptoms. Q3 months as needed. SUBJECTIVE/OBJECTIVE:  HPI:Patient presents for follow up on right shoulder pain. Resolution of painful symptoms after steroid injection. Since last visit patient has noted increased intermittent left sided chest pain 6/7-10. The discomfort is most prominent in the supine position and during deep inspiration. Social History     Tobacco Use    Smoking status: Never    Smokeless tobacco: Never   Vaping Use    Vaping Use: Never used   Substance Use Topics    Alcohol use:  Yes     Alcohol/week: 1.0 standard drink

## 2023-08-10 ENCOUNTER — TELEPHONE (OUTPATIENT)
Dept: INTERNAL MEDICINE CLINIC | Facility: CLINIC | Age: 63
End: 2023-08-10

## 2023-08-10 NOTE — TELEPHONE ENCOUNTER
Recent x-rays to evaluate previous low hemoglobin levels and for signs of inflammation have resulted. Previous anemia has improved. Hemoglobin currently at 13.2 which is up from 12.1 a month ago. Large red blood cells noted. May be a sign of B12 or folate deficiency. Signs of inflammation are absent. White blood cell count has resolved to normal range. Platelet count in a normal range. Spoke to patient, message above was relayed and understanding expressed.

## 2023-10-09 DIAGNOSIS — E78.5 HYPERLIPIDEMIA, UNSPECIFIED HYPERLIPIDEMIA TYPE: ICD-10-CM

## 2023-10-09 RX ORDER — ATORVASTATIN CALCIUM 20 MG/1
20 TABLET, FILM COATED ORAL DAILY
Qty: 30 TABLET | Refills: 0 | Status: SHIPPED | OUTPATIENT
Start: 2023-10-09

## 2023-10-09 NOTE — TELEPHONE ENCOUNTER
----- Message from Mercy Vázquez sent at 10/9/2023  8:11 AM EDT -----  Subject: Refill Request    QUESTIONS  Name of Medication? atorvastatin (LIPITOR) 20 MG tablet  Patient-reported dosage and instructions? 20 Mg; Once a day  How many days do you have left? 0  Preferred Pharmacy? [facility] phone number (if available)? [phone]  Additional Information for Provider? Pt is completely out of this   medication  ---------------------------------------------------------------------------  --------------  CALL BACK INFO  What is the best way for the office to contact you? OK to leave message on   voicemail  Preferred Call Back Phone Number? [phone]  ---------------------------------------------------------------------------  --------------  SCRIPT ANSWERS  Relationship to Patient? Spouse/Partner  Representative Name? Ace Quinteors  Is the representative on the Communication Release of Information (MISHEL)   form in Epic?  Yes

## 2023-11-03 DIAGNOSIS — R80.9 POSITIVE FOR MICROALBUMINURIA: ICD-10-CM

## 2023-11-03 DIAGNOSIS — E78.5 HYPERLIPIDEMIA, UNSPECIFIED HYPERLIPIDEMIA TYPE: ICD-10-CM

## 2023-11-03 DIAGNOSIS — I10 ESSENTIAL (PRIMARY) HYPERTENSION: ICD-10-CM

## 2023-11-03 RX ORDER — RAMIPRIL 1.25 MG/1
1.25 CAPSULE ORAL DAILY
Qty: 90 CAPSULE | Refills: 3 | OUTPATIENT
Start: 2023-11-03

## 2023-11-03 RX ORDER — ATORVASTATIN CALCIUM 20 MG/1
20 TABLET, FILM COATED ORAL DAILY
Qty: 30 TABLET | Refills: 0 | OUTPATIENT
Start: 2023-11-03

## 2023-11-03 RX ORDER — AMLODIPINE BESYLATE 10 MG/1
10 TABLET ORAL DAILY
Qty: 90 TABLET | Refills: 1 | OUTPATIENT
Start: 2023-11-03

## 2023-11-06 ENCOUNTER — OFFICE VISIT (OUTPATIENT)
Dept: INTERNAL MEDICINE CLINIC | Facility: CLINIC | Age: 63
End: 2023-11-06
Payer: COMMERCIAL

## 2023-11-06 VITALS
SYSTOLIC BLOOD PRESSURE: 135 MMHG | HEART RATE: 67 BPM | WEIGHT: 150 LBS | TEMPERATURE: 98.8 F | HEIGHT: 68 IN | RESPIRATION RATE: 12 BRPM | BODY MASS INDEX: 22.73 KG/M2 | DIASTOLIC BLOOD PRESSURE: 85 MMHG | OXYGEN SATURATION: 96 %

## 2023-11-06 DIAGNOSIS — R80.9 POSITIVE FOR MICROALBUMINURIA: ICD-10-CM

## 2023-11-06 DIAGNOSIS — M25.511 CHRONIC RIGHT SHOULDER PAIN: ICD-10-CM

## 2023-11-06 DIAGNOSIS — E55.9 VITAMIN D DEFICIENCY: Primary | ICD-10-CM

## 2023-11-06 DIAGNOSIS — G89.29 CHRONIC RIGHT SHOULDER PAIN: ICD-10-CM

## 2023-11-06 DIAGNOSIS — E78.5 HYPERLIPIDEMIA, UNSPECIFIED HYPERLIPIDEMIA TYPE: ICD-10-CM

## 2023-11-06 DIAGNOSIS — E78.2 MIXED HYPERLIPIDEMIA: ICD-10-CM

## 2023-11-06 DIAGNOSIS — I10 ESSENTIAL (PRIMARY) HYPERTENSION: ICD-10-CM

## 2023-11-06 PROCEDURE — 3079F DIAST BP 80-89 MM HG: CPT | Performed by: INTERNAL MEDICINE

## 2023-11-06 PROCEDURE — 3075F SYST BP GE 130 - 139MM HG: CPT | Performed by: INTERNAL MEDICINE

## 2023-11-06 PROCEDURE — 99214 OFFICE O/P EST MOD 30 MIN: CPT | Performed by: INTERNAL MEDICINE

## 2023-11-06 RX ORDER — AMLODIPINE BESYLATE 10 MG/1
10 TABLET ORAL DAILY
Qty: 90 TABLET | Refills: 1 | Status: SHIPPED | OUTPATIENT
Start: 2023-11-06

## 2023-11-06 RX ORDER — RAMIPRIL 1.25 MG/1
1.25 CAPSULE ORAL DAILY
Qty: 90 CAPSULE | Refills: 1 | Status: SHIPPED | OUTPATIENT
Start: 2023-11-06

## 2023-11-06 RX ORDER — ATORVASTATIN CALCIUM 20 MG/1
20 TABLET, FILM COATED ORAL DAILY
Qty: 30 TABLET | Refills: 0 | Status: SHIPPED | OUTPATIENT
Start: 2023-11-06

## 2023-11-06 RX ORDER — BUTYROSPERMUM PARKII(SHEA BUTTER), SIMMONDSIA CHINENSIS (JOJOBA) SEED OIL, ALOE BARBADENSIS LEAF EXTRACT .01; 1; 3.5 G/100G; G/100G; G/100G
5000 LIQUID TOPICAL DAILY
Qty: 90 CAPSULE | Refills: 5 | Status: SHIPPED | OUTPATIENT
Start: 2023-11-06

## 2023-11-06 SDOH — ECONOMIC STABILITY: INCOME INSECURITY: HOW HARD IS IT FOR YOU TO PAY FOR THE VERY BASICS LIKE FOOD, HOUSING, MEDICAL CARE, AND HEATING?: NOT HARD AT ALL

## 2023-11-06 SDOH — ECONOMIC STABILITY: FOOD INSECURITY: WITHIN THE PAST 12 MONTHS, THE FOOD YOU BOUGHT JUST DIDN'T LAST AND YOU DIDN'T HAVE MONEY TO GET MORE.: NEVER TRUE

## 2023-11-06 SDOH — ECONOMIC STABILITY: FOOD INSECURITY: WITHIN THE PAST 12 MONTHS, YOU WORRIED THAT YOUR FOOD WOULD RUN OUT BEFORE YOU GOT MONEY TO BUY MORE.: NEVER TRUE

## 2023-11-06 ASSESSMENT — ANXIETY QUESTIONNAIRES
3. WORRYING TOO MUCH ABOUT DIFFERENT THINGS: 0
2. NOT BEING ABLE TO STOP OR CONTROL WORRYING: 0
7. FEELING AFRAID AS IF SOMETHING AWFUL MIGHT HAPPEN: 0
GAD7 TOTAL SCORE: 0
IF YOU CHECKED OFF ANY PROBLEMS ON THIS QUESTIONNAIRE, HOW DIFFICULT HAVE THESE PROBLEMS MADE IT FOR YOU TO DO YOUR WORK, TAKE CARE OF THINGS AT HOME, OR GET ALONG WITH OTHER PEOPLE: NOT DIFFICULT AT ALL
6. BECOMING EASILY ANNOYED OR IRRITABLE: 0
4. TROUBLE RELAXING: 0
1. FEELING NERVOUS, ANXIOUS, OR ON EDGE: 0
5. BEING SO RESTLESS THAT IT IS HARD TO SIT STILL: 0

## 2023-11-06 ASSESSMENT — PATIENT HEALTH QUESTIONNAIRE - PHQ9
SUM OF ALL RESPONSES TO PHQ9 QUESTIONS 1 & 2: 0
2. FEELING DOWN, DEPRESSED OR HOPELESS: 0
SUM OF ALL RESPONSES TO PHQ QUESTIONS 1-9: 0
1. LITTLE INTEREST OR PLEASURE IN DOING THINGS: 0
SUM OF ALL RESPONSES TO PHQ QUESTIONS 1-9: 0

## 2023-11-06 NOTE — ASSESSMENT & PLAN NOTE
Patient with controlled BP in ftoday on second reading. He recently took BP medications prior to arrival.  Tolerant of meds without issue. Sometimes skips meds when in [place], but resumes upon return.  Recommending more consistentcy

## 2023-11-06 NOTE — PROGRESS NOTES
Normocephalic. Eyes:      Extraocular Movements: Extraocular movements intact. Cardiovascular:      Rate and Rhythm: Normal rate and regular rhythm. Pulses: Normal pulses. Musculoskeletal:      Cervical back: Normal range of motion. Skin:     General: Skin is warm and dry. Neurological:      Mental Status: He is alert. An electronic signature was used to authenticate this note.   Donnamae Saint Michaels, DO

## 2023-11-06 NOTE — ASSESSMENT & PLAN NOTE
Lab Results   Component Value Date    CHOL 142 04/24/2023    TRIG 78 04/24/2023    HDL 72 (H) 04/24/2023    LDLCALC 54.4 04/24/2023    LABVLDL 15.6 04/24/2023    VLDL 9 11/18/2021    CHOLHDLRATIO 2.0 04/24/2023     Previously maintained on statin, but with myalgias. Recent trial off at patient request. Will offer trial of pravachol at next lipid draw if elevations and ASVCD score. After discussion with patient. He is agreeable to resuming previously well tolerated Lipitor 20 nightly.

## 2023-11-06 NOTE — ASSESSMENT & PLAN NOTE
Lab Results   Component Value Date/Time    VITD25 20.3 04/24/2023 01:51 PM      Patient recommended to have 5000 IU D3 daily. Patient not currently taking. Will inquire. He states he will resume daily use.

## 2023-12-06 ENCOUNTER — OFFICE VISIT (OUTPATIENT)
Dept: ORTHOPEDIC SURGERY | Age: 63
End: 2023-12-06

## 2023-12-06 VITALS — WEIGHT: 145 LBS | BODY MASS INDEX: 24.75 KG/M2 | HEIGHT: 64 IN

## 2023-12-06 DIAGNOSIS — M19.011 ARTHRITIS OF RIGHT SHOULDER REGION: ICD-10-CM

## 2023-12-06 DIAGNOSIS — M25.511 RIGHT SHOULDER PAIN, UNSPECIFIED CHRONICITY: Primary | ICD-10-CM

## 2023-12-06 PROCEDURE — 99204 OFFICE O/P NEW MOD 45 MIN: CPT | Performed by: ORTHOPAEDIC SURGERY

## 2023-12-06 PROCEDURE — 20610 DRAIN/INJ JOINT/BURSA W/O US: CPT | Performed by: ORTHOPAEDIC SURGERY

## 2023-12-06 RX ORDER — METHYLPREDNISOLONE ACETATE 40 MG/ML
80 INJECTION, SUSPENSION INTRA-ARTICULAR; INTRALESIONAL; INTRAMUSCULAR; SOFT TISSUE ONCE
Status: COMPLETED | OUTPATIENT
Start: 2023-12-06 | End: 2023-12-06

## 2023-12-06 RX ADMIN — METHYLPREDNISOLONE ACETATE 80 MG: 40 INJECTION, SUSPENSION INTRA-ARTICULAR; INTRALESIONAL; INTRAMUSCULAR; SOFT TISSUE at 10:19
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
## 2024-01-17 ENCOUNTER — OFFICE VISIT (OUTPATIENT)
Dept: INTERNAL MEDICINE CLINIC | Facility: CLINIC | Age: 64
End: 2024-01-17

## 2024-01-17 VITALS
BODY MASS INDEX: 29.29 KG/M2 | WEIGHT: 149.2 LBS | HEART RATE: 73 BPM | SYSTOLIC BLOOD PRESSURE: 126 MMHG | OXYGEN SATURATION: 98 % | TEMPERATURE: 98.2 F | DIASTOLIC BLOOD PRESSURE: 80 MMHG | HEIGHT: 60 IN

## 2024-01-17 DIAGNOSIS — M54.2 CERVICALGIA: Primary | ICD-10-CM

## 2024-01-17 PROCEDURE — 99214 OFFICE O/P EST MOD 30 MIN: CPT | Performed by: INTERNAL MEDICINE

## 2024-01-17 PROCEDURE — 3074F SYST BP LT 130 MM HG: CPT | Performed by: INTERNAL MEDICINE

## 2024-01-17 PROCEDURE — 3079F DIAST BP 80-89 MM HG: CPT | Performed by: INTERNAL MEDICINE

## 2024-01-17 RX ORDER — NAPROXEN 500 MG/1
500 TABLET ORAL 2 TIMES DAILY WITH MEALS
Qty: 180 TABLET | Refills: 1 | Status: SHIPPED | OUTPATIENT
Start: 2024-01-17

## 2024-01-17 RX ORDER — BACLOFEN 10 MG/1
10 TABLET ORAL 3 TIMES DAILY
Qty: 21 TABLET | Refills: 0 | Status: SHIPPED | OUTPATIENT
Start: 2024-01-17

## 2024-01-17 ASSESSMENT — PATIENT HEALTH QUESTIONNAIRE - PHQ9
SUM OF ALL RESPONSES TO PHQ QUESTIONS 1-9: 0
SUM OF ALL RESPONSES TO PHQ9 QUESTIONS 1 & 2: 0
SUM OF ALL RESPONSES TO PHQ QUESTIONS 1-9: 0
1. LITTLE INTEREST OR PLEASURE IN DOING THINGS: 0
2. FEELING DOWN, DEPRESSED OR HOPELESS: 0
SUM OF ALL RESPONSES TO PHQ QUESTIONS 1-9: 0
SUM OF ALL RESPONSES TO PHQ QUESTIONS 1-9: 0

## 2024-01-17 NOTE — PROGRESS NOTES
Mental Status: He is alert.       An electronic signature was used to authenticate this note.  Clyde Talley DO

## 2024-01-17 NOTE — ACP (ADVANCE CARE PLANNING)
Advance Care Planning   The patient has the following advanced directives on file:  Advance Directives       Power of  Living Will ACP-Advance Directive ACP-Power of     Not on File Not on File Not on File Not on File            The patient has appointed the following active healthcare agents:    Primary Decision Maker: KassieFlor - Spouse - [phone]    The Patient has the following current code status:    Code Status: Not on file    Visit Documentation:  I discussed Advance Care Planning with Benito Fernando today which included the importance of making their choices for care and treatment in the case of a health event that adversely affects their decision-making abilities. He has not completed the Advance Care Directives. He does not have an active health care agent at this time.  Benito Fernando was encouraged to complete the declaration forms and provide a signed copy of his medical records.           Aaron Mead  1/17/2024

## 2024-04-30 DIAGNOSIS — I10 ESSENTIAL (PRIMARY) HYPERTENSION: ICD-10-CM

## 2024-04-30 DIAGNOSIS — E78.5 HYPERLIPIDEMIA, UNSPECIFIED HYPERLIPIDEMIA TYPE: ICD-10-CM

## 2024-05-01 RX ORDER — AMLODIPINE BESYLATE 10 MG/1
10 TABLET ORAL DAILY
Qty: 90 TABLET | Refills: 0 | Status: SHIPPED | OUTPATIENT
Start: 2024-05-01

## 2024-05-01 RX ORDER — ATORVASTATIN CALCIUM 20 MG/1
20 TABLET, FILM COATED ORAL DAILY
Qty: 90 TABLET | Refills: 0 | Status: SHIPPED | OUTPATIENT
Start: 2024-05-01